# Patient Record
Sex: FEMALE | Race: WHITE
[De-identification: names, ages, dates, MRNs, and addresses within clinical notes are randomized per-mention and may not be internally consistent; named-entity substitution may affect disease eponyms.]

---

## 2017-02-10 ENCOUNTER — HOSPITAL ENCOUNTER (OUTPATIENT)
Dept: HOSPITAL 84 - D.MAMMO | Age: 67
Discharge: HOME | End: 2017-02-10
Attending: FAMILY MEDICINE
Payer: MEDICARE

## 2017-02-10 VITALS — BODY MASS INDEX: 19.9 KG/M2

## 2017-02-10 DIAGNOSIS — Z12.31: Primary | ICD-10-CM

## 2018-10-02 ENCOUNTER — HOSPITAL ENCOUNTER (OUTPATIENT)
Dept: HOSPITAL 84 - D.MRI | Age: 68
Discharge: HOME | End: 2018-10-02
Attending: FAMILY MEDICINE
Payer: MEDICARE

## 2018-10-02 VITALS — BODY MASS INDEX: 19.9 KG/M2

## 2018-10-02 DIAGNOSIS — G45.9: Primary | ICD-10-CM

## 2019-06-11 ENCOUNTER — HOSPITAL ENCOUNTER (OUTPATIENT)
Dept: HOSPITAL 84 - D.MAMMO | Age: 69
Discharge: HOME | End: 2019-06-11
Attending: FAMILY MEDICINE
Payer: MEDICARE

## 2019-06-11 VITALS — BODY MASS INDEX: 19.9 KG/M2

## 2019-06-11 DIAGNOSIS — Z12.31: Primary | ICD-10-CM

## 2019-07-12 ENCOUNTER — HOSPITAL ENCOUNTER (OUTPATIENT)
Dept: HOSPITAL 84 - D.MAMMO | Age: 69
Discharge: HOME | End: 2019-07-12
Attending: FAMILY MEDICINE
Payer: MEDICARE

## 2019-07-12 VITALS — BODY MASS INDEX: 19.9 KG/M2

## 2019-07-12 DIAGNOSIS — R92.8: Primary | ICD-10-CM

## 2019-07-26 ENCOUNTER — HOSPITAL ENCOUNTER (OUTPATIENT)
Dept: HOSPITAL 84 - D.US | Age: 69
Discharge: HOME | End: 2019-07-26
Attending: SURGERY
Payer: MEDICARE

## 2019-07-26 VITALS — BODY MASS INDEX: 19.9 KG/M2

## 2019-07-26 DIAGNOSIS — N63.23: Primary | ICD-10-CM

## 2019-09-06 ENCOUNTER — HOSPITAL ENCOUNTER (OUTPATIENT)
Dept: HOSPITAL 84 - D.HCCARDIO | Age: 69
Discharge: HOME | End: 2019-09-06
Attending: INTERNAL MEDICINE
Payer: MEDICARE

## 2019-09-06 VITALS — BODY MASS INDEX: 19.9 KG/M2

## 2019-09-06 DIAGNOSIS — I25.10: Primary | ICD-10-CM

## 2020-04-07 ENCOUNTER — HOSPITAL ENCOUNTER (INPATIENT)
Dept: HOSPITAL 84 - D.M2 | Age: 70
LOS: 5 days | Discharge: HOME | DRG: 640 | End: 2020-04-12
Attending: FAMILY MEDICINE | Admitting: FAMILY MEDICINE
Payer: MEDICARE

## 2020-04-07 VITALS — SYSTOLIC BLOOD PRESSURE: 113 MMHG | DIASTOLIC BLOOD PRESSURE: 74 MMHG

## 2020-04-07 VITALS
BODY MASS INDEX: 23.02 KG/M2 | WEIGHT: 155.43 LBS | HEIGHT: 69 IN | BODY MASS INDEX: 23.02 KG/M2 | HEIGHT: 69 IN | BODY MASS INDEX: 23.02 KG/M2 | WEIGHT: 155.43 LBS

## 2020-04-07 VITALS — SYSTOLIC BLOOD PRESSURE: 108 MMHG | DIASTOLIC BLOOD PRESSURE: 68 MMHG

## 2020-04-07 VITALS — SYSTOLIC BLOOD PRESSURE: 155 MMHG | DIASTOLIC BLOOD PRESSURE: 72 MMHG

## 2020-04-07 DIAGNOSIS — D50.9: ICD-10-CM

## 2020-04-07 DIAGNOSIS — E05.90: ICD-10-CM

## 2020-04-07 DIAGNOSIS — I50.20: ICD-10-CM

## 2020-04-07 DIAGNOSIS — E11.65: ICD-10-CM

## 2020-04-07 DIAGNOSIS — E87.1: Primary | ICD-10-CM

## 2020-04-07 DIAGNOSIS — K21.9: ICD-10-CM

## 2020-04-07 DIAGNOSIS — E83.42: ICD-10-CM

## 2020-04-07 DIAGNOSIS — G93.41: ICD-10-CM

## 2020-04-07 DIAGNOSIS — I11.0: ICD-10-CM

## 2020-04-07 DIAGNOSIS — E78.5: ICD-10-CM

## 2020-04-07 DIAGNOSIS — I48.91: ICD-10-CM

## 2020-04-07 DIAGNOSIS — I25.10: ICD-10-CM

## 2020-04-07 DIAGNOSIS — E87.6: ICD-10-CM

## 2020-04-07 LAB
ALBUMIN SERPL-MCNC: 3.3 G/DL (ref 3.4–5)
ALP SERPL-CCNC: 98 U/L (ref 30–120)
ALT SERPL-CCNC: 20 U/L (ref 10–68)
ANION GAP SERPL CALC-SCNC: 11.8 MMOL/L (ref 8–16)
BILIRUB SERPL-MCNC: 0.33 MG/DL (ref 0.2–1.3)
BUN SERPL-MCNC: 12 MG/DL (ref 7–18)
CALCIUM SERPL-MCNC: 8.8 MG/DL (ref 8.5–10.1)
CHLORIDE SERPL-SCNC: 89 MMOL/L (ref 98–107)
CO2 SERPL-SCNC: 31.1 MMOL/L (ref 21–32)
CREAT SERPL-MCNC: 0.6 MG/DL (ref 0.6–1.3)
GLOBULIN SER-MCNC: 3.3 G/L
GLUCOSE SERPL-MCNC: 144 MG/DL (ref 74–106)
OSMOLALITY SERPL CALC.SUM OF ELEC: 259 MOSM/KG (ref 275–300)
POTASSIUM SERPL-SCNC: 3.9 MMOL/L (ref 3.5–5.1)
PROT SERPL-MCNC: 6.6 G/DL (ref 6.4–8.2)
SODIUM SERPL-SCNC: 128 MMOL/L (ref 136–145)
TSH SERPL-ACNC: 2.4 UIU/ML (ref 0.36–3.74)

## 2020-04-07 NOTE — NUR
REPORT RECEIVED, WILL CONTINUE POC. PATIENT IS AAOX4, LYING IN SEMI-FOWLERS
POSITION. NO S/S OF DISTRESS OBSERVED, RR EVEN AND UNLABORED ON ROOM AIR. PIV
TO RT HAND, SL. PATIENT DENIES NEEDS AT THIS TIME. CL IN REACH, BED LOCKED AND
LOWERED. WILL CTM.

## 2020-04-08 VITALS — DIASTOLIC BLOOD PRESSURE: 70 MMHG | SYSTOLIC BLOOD PRESSURE: 148 MMHG

## 2020-04-08 VITALS — SYSTOLIC BLOOD PRESSURE: 158 MMHG | DIASTOLIC BLOOD PRESSURE: 70 MMHG

## 2020-04-08 VITALS — SYSTOLIC BLOOD PRESSURE: 151 MMHG | DIASTOLIC BLOOD PRESSURE: 71 MMHG

## 2020-04-08 VITALS — SYSTOLIC BLOOD PRESSURE: 150 MMHG | DIASTOLIC BLOOD PRESSURE: 76 MMHG

## 2020-04-08 VITALS — DIASTOLIC BLOOD PRESSURE: 74 MMHG | SYSTOLIC BLOOD PRESSURE: 157 MMHG

## 2020-04-08 LAB
ANION GAP SERPL CALC-SCNC: 6.4 MMOL/L (ref 8–16)
APTT BLD: 29.5 SECONDS (ref 22.8–39.4)
BASOPHILS NFR BLD AUTO: 0.2 % (ref 0–2)
BILIRUB SERPL-MCNC: NEGATIVE MG/DL
BUN SERPL-MCNC: 8 MG/DL (ref 7–18)
CALCIUM SERPL-MCNC: 8.4 MG/DL (ref 8.5–10.1)
CHLORIDE SERPL-SCNC: 90 MMOL/L (ref 98–107)
CO2 SERPL-SCNC: 30.8 MMOL/L (ref 21–32)
CREAT SERPL-MCNC: 0.6 MG/DL (ref 0.6–1.3)
D DIMER PPP FEU-MCNC: 2.45 UG/MLFEU (ref 0.2–0.54)
EOSINOPHIL NFR BLD: 0 % (ref 0–7)
ERYTHROCYTE [DISTWIDTH] IN BLOOD BY AUTOMATED COUNT: 13.5 % (ref 11.5–14.5)
EST. AVERAGE GLUCOSE BLD GHB EST-MCNC: 143 MG/DL (ref 74–154)
GLUCOSE SERPL-MCNC: 150 MG/DL (ref 74–106)
GLUCOSE SERPL-MCNC: NEGATIVE MG/DL
HCT VFR BLD CALC: 37.4 % (ref 36–48)
HGB BLD-MCNC: 12.2 G/DL (ref 12–16)
IMM GRANULOCYTES NFR BLD: 0 % (ref 0–5)
INR PPP: 1.03 (ref 0.85–1.17)
KETONES UR STRIP-MCNC: NEGATIVE MG/DL
LYMPHOCYTES NFR BLD AUTO: 9.9 % (ref 15–50)
MAGNESIUM SERPL-MCNC: 1.7 MG/DL (ref 1.8–2.4)
MCH RBC QN AUTO: 29.9 PG (ref 26–34)
MCHC RBC AUTO-ENTMCNC: 32.6 G/DL (ref 31–37)
MCV RBC: 91.7 FL (ref 80–100)
MONOCYTES NFR BLD: 9 % (ref 2–11)
NEUTROPHILS NFR BLD AUTO: 80.9 % (ref 40–80)
NITRITE UR-MCNC: NEGATIVE MG/ML
OSMOLALITY SERPL CALC.SUM OF ELEC: 250 MOSM/KG (ref 275–300)
PH UR STRIP: 9 [PH] (ref 5–6)
PHOSPHATE SERPL-MCNC: 2.6 MG/DL (ref 2.5–4.9)
PLATELET # BLD: 411 10X3/UL (ref 130–400)
PMV BLD AUTO: 8.9 FL (ref 7.4–10.4)
POTASSIUM SERPL-SCNC: 3.2 MMOL/L (ref 3.5–5.1)
PROTHROMBIN TIME: 13.5 SECONDS (ref 11.6–15)
RBC # BLD AUTO: 4.08 10X6/UL (ref 4–5.4)
SODIUM SERPL-SCNC: 124 MMOL/L (ref 136–145)
SP GR UR STRIP: 1 (ref 1–1.02)
T4 FREE SERPL-MCNC: 1.8 NG/DL (ref 0.76–1.46)
UROBILINOGEN UR-MCNC: NORMAL MG/DL
WBC # BLD AUTO: 5.3 10X3/UL (ref 4.8–10.8)

## 2020-04-08 NOTE — NUR
REPORT RECEIVED, WILL CONTINUE POC. PATIENT IS AAOX4, LYING IN SEMI-FOWLERS
POSITION. NO S/S OF DISTRESS OBSERVED, RR EVEN AND UNLABORED ON ROOM AIR. PIV
TO RT HAND, EMERSON, SANDRA C/D/I. PATIENT DENIES NEEDS AT THIS TIME. CL IN REACH,
BED LOCKED AND LOWERED. WILL CTM.

## 2020-04-08 NOTE — NUR
PT C/O NAUSEA THIS MORNING, WAS ADMINISTERED PRN NAUSEA MEDICATION AT 0615
UNABLE TO GIVE ANOTHER DOSE AT THIS MOMENT AS MEDICATION IS ORDERED Q8 PRN,
CONTINUE WITH PLAN OF CARE

## 2020-04-08 NOTE — NUR
PATIENT DAUGHTER CALLED FOR UPDATE. PASSCODE NOT VERIFIED. INFORMED DAUGHTER
INFORMATION COULD NOT BE GIVEN WITHOUT PASSCODE.

## 2020-04-09 VITALS — DIASTOLIC BLOOD PRESSURE: 54 MMHG | SYSTOLIC BLOOD PRESSURE: 96 MMHG

## 2020-04-09 VITALS — SYSTOLIC BLOOD PRESSURE: 125 MMHG | DIASTOLIC BLOOD PRESSURE: 57 MMHG

## 2020-04-09 VITALS — DIASTOLIC BLOOD PRESSURE: 90 MMHG | SYSTOLIC BLOOD PRESSURE: 159 MMHG

## 2020-04-09 VITALS — SYSTOLIC BLOOD PRESSURE: 165 MMHG | DIASTOLIC BLOOD PRESSURE: 70 MMHG

## 2020-04-09 VITALS — SYSTOLIC BLOOD PRESSURE: 171 MMHG | DIASTOLIC BLOOD PRESSURE: 79 MMHG

## 2020-04-09 LAB
ANION GAP SERPL CALC-SCNC: 10.8 MMOL/L (ref 8–16)
BASOPHILS NFR BLD AUTO: 0.5 % (ref 0–2)
BUN SERPL-MCNC: 12 MG/DL (ref 7–18)
CALCIUM SERPL-MCNC: 8.7 MG/DL (ref 8.5–10.1)
CHLORIDE SERPL-SCNC: 95 MMOL/L (ref 98–107)
CO2 SERPL-SCNC: 29.6 MMOL/L (ref 21–32)
CREAT SERPL-MCNC: 0.6 MG/DL (ref 0.6–1.3)
EOSINOPHIL NFR BLD: 0.7 % (ref 0–7)
ERYTHROCYTE [DISTWIDTH] IN BLOOD BY AUTOMATED COUNT: 13.7 % (ref 11.5–14.5)
GLUCOSE SERPL-MCNC: 139 MG/DL (ref 74–106)
HCT VFR BLD CALC: 40.5 % (ref 36–48)
HGB BLD-MCNC: 13.1 G/DL (ref 12–16)
IMM GRANULOCYTES NFR BLD: 0.2 % (ref 0–5)
LYMPHOCYTES NFR BLD AUTO: 11.8 % (ref 15–50)
MAGNESIUM SERPL-MCNC: 2.4 MG/DL (ref 1.8–2.4)
MCH RBC QN AUTO: 29.8 PG (ref 26–34)
MCHC RBC AUTO-ENTMCNC: 32.3 G/DL (ref 31–37)
MCV RBC: 92.3 FL (ref 80–100)
MONOCYTES NFR BLD: 14.1 % (ref 2–11)
NEUTROPHILS NFR BLD AUTO: 72.7 % (ref 40–80)
OSMOLALITY SERPL CALC.SUM OF ELEC: 266 MOSM/KG (ref 275–300)
PHOSPHATE SERPL-MCNC: 3.3 MG/DL (ref 2.5–4.9)
PLATELET # BLD: 406 10X3/UL (ref 130–400)
PMV BLD AUTO: 9.7 FL (ref 7.4–10.4)
POTASSIUM SERPL-SCNC: 3.4 MMOL/L (ref 3.5–5.1)
RBC # BLD AUTO: 4.39 10X6/UL (ref 4–5.4)
SODIUM SERPL-SCNC: 132 MMOL/L (ref 136–145)
THYROGLOB AB SERPL-ACNC: <1 IU/ML (ref 0–0.9)
THYROPEROXIDASE AB SERPL-ACNC: <9 IU/ML (ref 0–34)
WBC # BLD AUTO: 6 10X3/UL (ref 4.8–10.8)

## 2020-04-09 NOTE — NUR
PATIENT REFUSED HER INSULIN FOR A 154 FSBS BECAUSE AT HOME SHE DOSENT TAKE
INSULIN SHE TAKES PILLS. EDUCATED HER WHY INSULIN HERE AND SHE STATES NO SHE
DOSE NOT WANT. REFUSED HER EYE DROPS BECAUSE SHE STATES AT HOME SHE ONLY TAKES
TWO BUT CANT GET HER  TO ANSWER THE PHONE AND DOSENT REMEMBER WHICH
ONES SHE TAKE SO SHE DOSENT WANT ANY. SHE STATES SHE WILL BE FINE SHE MISSING
THEM AT HOME SOMETIMES AND STATES SHE WILL HOPEFULLY BEING GOING HOME IN THE
MORNING ANYWAYS.

## 2020-04-09 NOTE — NUR
PT LAYING SUPINE. RR EVEN AND UNLABORED. DENIES NEEDS OR PAIN AT THIS TIME,
CALL LIGHT WIHIN REACH. BED IN LOWEST POSITION. WILL CONTINUE TO MONITOR.

## 2020-04-09 NOTE — NUR
AT , PER POLICY WAS TOLD NO VISITORS AT THIS TIME. RECIEVED
PERMISSION FROM PT TO SPEAK WITH HIM. SPENT APPROX. 30 MINUTES TALKING TO HIM
ABOUT PLAN OF CARE. VERBALIZED UNDERSTANDING. ALSO SPOKE WITH DAUGHTER ON
PHONE AND SPENT AN ADDITIONAL 10 MINUTES SPEAKING WITH HER ABOUT PLAN OF CARE
WITH PT'S PERMISSION. VERBALIZED UNDERSTANDING. UPDATED PT WITH HER POC AS
WELL. LOR CLEMENTE STATED SHE SPOKE WITH DR FUENTES AND ORDERS TO BE
RECIEVED. PT TO SHOWER WITH MINIMAL ASSISTANCE. RECIEVED BAG WITH PERSONAL
ITEMS FROM . STATES IT IS ALL THERE. WILL CONTINUE TO MONITOR.

## 2020-04-09 NOTE — NUR
REPORT RECIEVD AND ROUNDING COMPLETE. PATIENT LAYING IN BED IN LOW FOWLERS
POSITION. STATES FEELING BETTER TODAY. RIGHT HAND PIV THAT IS PATENT AND
FLUSHES WELL. A&O X4. NO S/SX OF DISTRESS AT THIS TIME. STATES NO NEEDS AT
THIS TIME. CALL LIGHT WITHIN REACH AND BED IN LOWEST LOCKED POSITION.

## 2020-04-09 NOTE — [facility]ARE
CASE MANAGEMENT DISCHARGE SUMMARY
 
 
PATIENT: CAYDEN TORRES                          UNIT: [id number]
ACCOUNT#: [id number]                       ADM DATE: 20
AGE: 69     : 46  SEX: F            ROOM/BED: D.2104    
AUTHOR: AMIRADOC                             PHYSICIAN:                               
 
REFERRING PHYSICIAN: LAURA AGUILLON MD              
DATE OF SERVICE: 20
Discharge Plan
 
 
Patient Name: CAYDEN TORRES
Facility: [facility]:[place]
Encounter #: [id number]
Medical Record #: [id number]
: 1950
Planned Disposition: Home
Anticipated Discharge Date: 
 
Discharge Date: 
Expected LOS: 
Initial Reviewer: [email or web address]
Initial Review Date: 2020
Generated: 20   3:43 pm 
Comments
 
DCP- Discharge Planning
 
Updated by [id number]: Dru Barton on 20   1:42 pm CT
Patient Name: CAYDEN TORRES                                     
Admission Status: Elective   
Accout number: [id number]                              
Admission Date: 2020   
: 1950                                                        
Admission Diagnosis:   
Attending: LAURA HADDAD                                                
Current LOS:  2   
  
Anticipated DC Date:    
Planned Disposition: Home   
Primary Insurance: MEDICARE A & B   
  
  
Discharge Planning Comments:   
CM MET WITH PT IN ROOM TO DISCUSS DISCHARGE PLANNING AND NEEDS. PT REPORTS 
LIVING AT HOME INDEPENDENTLY WITH HER SPOUSE. PT HAS WALKER WITH NO MEDICAL 
EQUIPMENT PROVIDER PREFERENCE.  PT HAS NO OUTSIDE SERVICES ASSISTING IN THE 
HOME. CM DISCUSSED AVAILABILITY OF HOME HEALTH, REHAB SERVICES AND MEDICAL 
 
EQUIPMENT. PT DENIES DISCHARGE NEEDS, REPORTS HER SPOUSE WILL PICK HER UP FOR 
DISCHARGE HOME  
  
  
: Dru Barton
 DCPIA - Discharge Planning Initial Assessment
 
Updated by [id number]: Dru Barton on 20   2:42 pm
*  Is the patient Alert and Oriented?
Yes
*  How many steps to enter\exit or inside your home? 0-I/13-I *  PCP DR. AGUILLON *  Pharmacy
[facility]
*  Preadmission Environment
Home with Family
*  ADLs
Independent
*  Equipment
Walker
*  Other Equipment
NO MEDICAL EQUIPMENT PROVIDER PREFERENCE
*  List name and contact numbers for known caregivers / representatives who 
currently or will assist patient after discharge:
JONAH TORRES, SPOUSE, [phone]
*  Verbal permission to speak to the caregivers and representatives has been 
obtained from the patient.
N/A
*  Community resources currently utilized
None
*  Please name any agencies selected above.
NONE
*  Additional services required to return to the preadmission environment?
No
*  Can the patient safely return to the preadmission environment?
Yes
*  Has this patient been hospitalized within the prior 30 days at any 
hospital?
No
 
 
 
 
 
 
Patient Name: CAYDEN TORRES
 
Encounter #: [id number]
Page [id number]
 
 
 
 
 
Electronically Signed by NATALIIA COLLIER on 20 at 1443
 
 
 
 
 
 
**All edits/amendments must be made on the electronic document**
 
DICTATION DATE: 20     : MADAI  20     
RPT#: [id number]                                DC DATE:        
                                               STATUS: ADM IN  
[facility]
 [place] [address]
***END OF REPORT***

## 2020-04-10 VITALS — DIASTOLIC BLOOD PRESSURE: 77 MMHG | SYSTOLIC BLOOD PRESSURE: 149 MMHG

## 2020-04-10 VITALS — SYSTOLIC BLOOD PRESSURE: 147 MMHG | DIASTOLIC BLOOD PRESSURE: 82 MMHG

## 2020-04-10 VITALS — SYSTOLIC BLOOD PRESSURE: 145 MMHG | DIASTOLIC BLOOD PRESSURE: 83 MMHG

## 2020-04-10 VITALS — DIASTOLIC BLOOD PRESSURE: 75 MMHG | SYSTOLIC BLOOD PRESSURE: 139 MMHG

## 2020-04-10 LAB
ANION GAP SERPL CALC-SCNC: 8.1 MMOL/L (ref 8–16)
BASOPHILS NFR BLD AUTO: 0.5 % (ref 0–2)
BUN SERPL-MCNC: 20 MG/DL (ref 7–18)
CALCIUM SERPL-MCNC: 8.6 MG/DL (ref 8.5–10.1)
CHLORIDE SERPL-SCNC: 100 MMOL/L (ref 98–107)
CO2 SERPL-SCNC: 33 MMOL/L (ref 21–32)
CREAT SERPL-MCNC: 0.8 MG/DL (ref 0.6–1.3)
EOSINOPHIL NFR BLD: 1.8 % (ref 0–7)
ERYTHROCYTE [DISTWIDTH] IN BLOOD BY AUTOMATED COUNT: 14.2 % (ref 11.5–14.5)
GLUCOSE SERPL-MCNC: 121 MG/DL (ref 74–106)
HCT VFR BLD CALC: 39.6 % (ref 36–48)
HGB BLD-MCNC: 12.3 G/DL (ref 12–16)
IMM GRANULOCYTES NFR BLD: 0.2 % (ref 0–5)
LYMPHOCYTES NFR BLD AUTO: 12.5 % (ref 15–50)
MAGNESIUM SERPL-MCNC: 2.4 MG/DL (ref 1.8–2.4)
MCH RBC QN AUTO: 29.4 PG (ref 26–34)
MCHC RBC AUTO-ENTMCNC: 31.1 G/DL (ref 31–37)
MCV RBC: 94.7 FL (ref 80–100)
MONOCYTES NFR BLD: 17.1 % (ref 2–11)
NEUTROPHILS NFR BLD AUTO: 67.9 % (ref 40–80)
OSMOLALITY SERPL CALC.SUM OF ELEC: 277 MOSM/KG (ref 275–300)
PHOSPHATE SERPL-MCNC: 3.9 MG/DL (ref 2.5–4.9)
PLATELET # BLD: 368 10X3/UL (ref 130–400)
PMV BLD AUTO: 9.4 FL (ref 7.4–10.4)
POTASSIUM SERPL-SCNC: 4.1 MMOL/L (ref 3.5–5.1)
RBC # BLD AUTO: 4.18 10X6/UL (ref 4–5.4)
SODIUM SERPL-SCNC: 137 MMOL/L (ref 136–145)
WBC # BLD AUTO: 6.1 10X3/UL (ref 4.8–10.8)

## 2020-04-10 NOTE — NUR
PT LYINIG IN BED AWAKE ALWERT AND ORIENTED x4. PT SEEMS TO BED SAD. STATES
THAT SHE MISSES HER FAMILY. RESPIRATIONS EVEN AND UNLABORED. ENCOURAGED PT TO
CALL FOR HELP OR AS NEEDED. CALL LIGHT WITH IN REACH AND BED ISIN LOWEST
POSITION. WILL CONTINUE TO MONITOR.

## 2020-04-10 NOTE — NUR
Nutrition Follow-up:
Gallbladder US and GES today. Pt reports tolerating PO intake better yesterday
afternoon/this morning; eating light foods (ie yogurt, soup, etc).
Wt: 155# (4/10); 166# (admit)
Last BM: PTA
Labs noted: Na 137, Glu 121
Meds noted: Protonix, KDur
-Rec resume diet following tests when medically feasible.
-Encourage PO intake and honor food preferences within diet restrictions.
-Monitor wt.
-RD following.

## 2020-04-10 NOTE — NUR
PATIENT STATES SHE WANTS TO GO HOME AND TAKE HER OWN MEDICATIONS THE WAY SHE
TAKES THEM AT HOME. PATIENT SLEEPY. NOT WANTING TO GET UP.

## 2020-04-11 VITALS — DIASTOLIC BLOOD PRESSURE: 72 MMHG | SYSTOLIC BLOOD PRESSURE: 134 MMHG

## 2020-04-11 VITALS — DIASTOLIC BLOOD PRESSURE: 94 MMHG | SYSTOLIC BLOOD PRESSURE: 118 MMHG

## 2020-04-11 VITALS — SYSTOLIC BLOOD PRESSURE: 103 MMHG | DIASTOLIC BLOOD PRESSURE: 63 MMHG

## 2020-04-11 VITALS — SYSTOLIC BLOOD PRESSURE: 117 MMHG | DIASTOLIC BLOOD PRESSURE: 55 MMHG

## 2020-04-11 VITALS — DIASTOLIC BLOOD PRESSURE: 66 MMHG | SYSTOLIC BLOOD PRESSURE: 126 MMHG

## 2020-04-11 LAB
ANION GAP SERPL CALC-SCNC: 7.5 MMOL/L (ref 8–16)
BASOPHILS NFR BLD AUTO: 0.6 % (ref 0–2)
BUN SERPL-MCNC: 19 MG/DL (ref 7–18)
CALCIUM SERPL-MCNC: 8.8 MG/DL (ref 8.5–10.1)
CHLORIDE SERPL-SCNC: 100 MMOL/L (ref 98–107)
CO2 SERPL-SCNC: 31.6 MMOL/L (ref 21–32)
CREAT SERPL-MCNC: 0.8 MG/DL (ref 0.6–1.3)
EOSINOPHIL NFR BLD: 4.1 % (ref 0–7)
ERYTHROCYTE [DISTWIDTH] IN BLOOD BY AUTOMATED COUNT: 14.6 % (ref 11.5–14.5)
GLUCOSE SERPL-MCNC: 112 MG/DL (ref 74–106)
HCT VFR BLD CALC: 39.5 % (ref 36–48)
HGB BLD-MCNC: 12.2 G/DL (ref 12–16)
IMM GRANULOCYTES NFR BLD: 0.1 % (ref 0–5)
LYMPHOCYTES NFR BLD AUTO: 15 % (ref 15–50)
MAGNESIUM SERPL-MCNC: 2.4 MG/DL (ref 1.8–2.4)
MCH RBC QN AUTO: 29.8 PG (ref 26–34)
MCHC RBC AUTO-ENTMCNC: 30.9 G/DL (ref 31–37)
MCV RBC: 96.3 FL (ref 80–100)
MONOCYTES NFR BLD: 14.1 % (ref 2–11)
NEUTROPHILS NFR BLD AUTO: 66.1 % (ref 40–80)
OSMOLALITY SERPL CALC.SUM OF ELEC: 272 MOSM/KG (ref 275–300)
PHOSPHATE SERPL-MCNC: 4.3 MG/DL (ref 2.5–4.9)
PLATELET # BLD: 377 10X3/UL (ref 130–400)
PMV BLD AUTO: 9.8 FL (ref 7.4–10.4)
POTASSIUM SERPL-SCNC: 4.1 MMOL/L (ref 3.5–5.1)
RBC # BLD AUTO: 4.1 10X6/UL (ref 4–5.4)
SODIUM SERPL-SCNC: 135 MMOL/L (ref 136–145)
WBC # BLD AUTO: 6.8 10X3/UL (ref 4.8–10.8)

## 2020-04-11 NOTE — NUR
BEDSIDE REPORT RECEIVED, PT CARE ASSUMED. INTRODUCED SELF AND WROTE NAME ON
BOARD. PT SITTING UP IN BED, TALKING ON THE PHONE, AAOX4. DENIES ANY NEEDS AT
THIS TIME. BED IN LOWEST POSITION, SR X2, CALL LIGHT WITHIN REACH. WILL
CONTINUE TO MONITOR.

## 2020-04-12 VITALS — SYSTOLIC BLOOD PRESSURE: 129 MMHG | DIASTOLIC BLOOD PRESSURE: 67 MMHG

## 2020-04-12 VITALS — SYSTOLIC BLOOD PRESSURE: 122 MMHG | DIASTOLIC BLOOD PRESSURE: 61 MMHG

## 2020-04-12 LAB
ANION GAP SERPL CALC-SCNC: 11 MMOL/L (ref 8–16)
BASOPHILS NFR BLD AUTO: 0.7 % (ref 0–2)
BUN SERPL-MCNC: 17 MG/DL (ref 7–18)
CALCIUM SERPL-MCNC: 8.8 MG/DL (ref 8.5–10.1)
CHLORIDE SERPL-SCNC: 101 MMOL/L (ref 98–107)
CO2 SERPL-SCNC: 31.7 MMOL/L (ref 21–32)
CREAT SERPL-MCNC: 0.7 MG/DL (ref 0.6–1.3)
EOSINOPHIL NFR BLD: 4.4 % (ref 0–7)
ERYTHROCYTE [DISTWIDTH] IN BLOOD BY AUTOMATED COUNT: 14.4 % (ref 11.5–14.5)
GLUCOSE SERPL-MCNC: 114 MG/DL (ref 74–106)
HCT VFR BLD CALC: 38.3 % (ref 36–48)
HGB BLD-MCNC: 11.7 G/DL (ref 12–16)
IMM GRANULOCYTES NFR BLD: 0.2 % (ref 0–5)
LYMPHOCYTES NFR BLD AUTO: 15 % (ref 15–50)
MAGNESIUM SERPL-MCNC: 2.3 MG/DL (ref 1.8–2.4)
MCH RBC QN AUTO: 29 PG (ref 26–34)
MCHC RBC AUTO-ENTMCNC: 30.5 G/DL (ref 31–37)
MCV RBC: 95 FL (ref 80–100)
MONOCYTES NFR BLD: 11.5 % (ref 2–11)
NEUTROPHILS NFR BLD AUTO: 68.2 % (ref 40–80)
OSMOLALITY SERPL CALC.SUM OF ELEC: 280 MOSM/KG (ref 275–300)
PHOSPHATE SERPL-MCNC: 4.1 MG/DL (ref 2.5–4.9)
PLATELET # BLD: 340 10X3/UL (ref 130–400)
PMV BLD AUTO: 9.6 FL (ref 7.4–10.4)
POTASSIUM SERPL-SCNC: 4.7 MMOL/L (ref 3.5–5.1)
RBC # BLD AUTO: 4.03 10X6/UL (ref 4–5.4)
SODIUM SERPL-SCNC: 139 MMOL/L (ref 136–145)
WBC # BLD AUTO: 5.9 10X3/UL (ref 4.8–10.8)

## 2020-04-12 NOTE — M[facility]ECARE
CASE MANAGEMENT DISCHARGE SUMMARY
 
 
PATIENT: CAYDEN TORRES                          UNIT: [id number]
ACCOUNT#: [id number]                       ADM DATE: 20
AGE: 69     : 46  SEX: F            ROOM/BED: D.0457    
AUTHOR: AMIRADOC                             PHYSICIAN:                               
 
REFERRING PHYSICIAN: LAURA AGUILLON MD              
DATE OF SERVICE: 20
Discharge Plan
 
 
Patient Name: CAYDEN TORRES
Facility: [facility]:[place]
Encounter #: [id number]
Medical Record #: [id number]
: 1950
Planned Disposition: Home
Anticipated Discharge Date: 
 
Discharge Date: 2020
Expected LOS: 0
Initial Reviewer: [id number]
Initial Review Date: 2020
Generated: 20   2:14 pm 
Comments
 
DCP- Discharge Planning
 
Updated by [email or web address]: Precious rPado on 20   9:59 am CT
Patient Name:  CAYDEN TORRES   
Encounter No:  [id number]   
:  1950   
Primary Insurance:  MEDICARE A & B  
Anticipated DC Date:    
Planned Disposition:  Home  
External Planned Provider: :   
  
  
DCP follow-up note: Patient  in agreement with discharge plan. No changes to 
plan. Case management will follow and assist as needed.  
Precious Eve
DCP- Discharge Planning
 
Updated by [email or web address]: Dru Barton on 20   1:42 pm CT
Patient Name: CAYDEN TORRES                                     
Admission Status: Elective   
Accout number: [id number]                              
Admission Date: 2020   
 
: 1950                                                        
Admission Diagnosis:   
Attending: LAURA HADDAD                                                
Current LOS:  2   
  
Anticipated DC Date:    
Planned Disposition: Home   
Primary Insurance: MEDICARE A & B   
  
  
Discharge Planning Comments:   
CM MET WITH PT IN ROOM TO DISCUSS DISCHARGE PLANNING AND NEEDS. PT REPORTS 
LIVING AT HOME INDEPENDENTLY WITH HER SPOUSE. PT HAS WALKER WITH NO MEDICAL 
EQUIPMENT PROVIDER PREFERENCE.  PT HAS NO OUTSIDE SERVICES ASSISTING IN THE 
HOME. CM DISCUSSED AVAILABILITY OF HOME HEALTH, REHAB SERVICES AND MEDICAL 
EQUIPMENT. PT DENIES DISCHARGE NEEDS, REPORTS HER SPOUSE WILL PICK HER UP FOR 
DISCHARGE HOME  
  
  
: Dru Barton
 DCPIA - Discharge Planning Initial Assessment
 
Updated by [id number]: Dru Barton on 20   2:42 pm
*  Is the patient Alert and Oriented?
Yes
*  How many steps to enter\exit or inside your home? 0-I/13-I *  PCP DR. AGUILLON *  Pharmacy
[facility]
*  Preadmission Environment
Home with Family
*  ADLs
Independent
*  Equipment
Walker
*  Other Equipment
NO MEDICAL EQUIPMENT PROVIDER PREFERENCE
*  List name and contact numbers for known caregivers / representatives who 
currently or will assist patient after discharge:
JONAH TORRES, SPOUSE, [phone]
*  Verbal permission to speak to the caregivers and representatives has been 
obtained from the patient.
N/A
*  Community resources currently utilized
None
*  Please name any agencies selected above.
NONE
*  Additional services required to return to the preadmission environment?
No
*  Can the patient safely return to the preadmission environment?
Yes
*  Has this patient been hospitalized within the prior 30 days at any 
hospital?
No
 
 
 
 
 
 
Coverage Notice
 
Reviewer: [email or web address] Armaan Prado
 
Notice Issued Date-Time: 2020  10:45
Notice Type: IM Discharge Notice
 
Notice Delivered To: Patient
Relationship to Patient: Self
Representative Name: 
 
Delivery Method: HAND - Hand Delivered
Holly Days:
Prior Verbal Notification: 
 
Recipient Understood Notice: Yes
Recipient Signature: Yes
Med Rec Note Co-signed by Attending:
 
Coverage Notice Comment:  IMM delivered, explained, signed, given, copy placed
in MR
 
Last DP export: 20  10:06 a
Patient Name: CAYDEN TORRES
Encounter #: [id number]
Page [id number]
 
 
 
 
 
Electronically Signed by NATALIIA COLLIER on 20 at 1314
 
 
 
 
 
 
**All edits/amendments must be made on the electronic document**
 
DICTATION DATE: 20 1314     : MADAI  20 1314     
RPT#: [id number]                                DC DATE:20
                                               STATUS: DIS IN  
[facility]
[address] [place] [address]
***END OF REPORT***

## 2020-04-12 NOTE — MORECARE
CASE MANAGEMENT DISCHARGE SUMMARY
 
 
PATIENT: CAYDEN TORRES                          UNIT: [id number]
ACCOUNT#: [id number]                       ADM DATE: 20
AGE: 69     : 46  SEX: F            ROOM/BED: D.2104    
AUTHOR: AMIRA,DOC                             PHYSICIAN:                               
 
REFERRING PHYSICIAN: LAURA AGUILLON MD              
DATE OF SERVICE: 20
Discharge Plan
 
 
Patient Name: CAYDEN TORRES
Facility: [facility]:[place]
Encounter #: [id number]
Medical Record #: [id number]
: 1950
Planned Disposition: Home
Anticipated Discharge Date: 
 
Discharge Date: 
Expected LOS: 
Initial Reviewer: [email or web address]
Initial Review Date: 2020
Generated: 20  12:05 pm 
Comments
 
DCP- Discharge Planning
 
Updated by [email or web address]: Precious Prado on 20   9:59 am CT
Patient Name:  CAYDEN TORRES   
Encounter No:  [id number]   
:  1950   
Primary Insurance:  MEDICARE A & B  
Anticipated DC Date:    
Planned Disposition:  Home  
External Planned Provider: :   
  
  
DCP follow-up note: Patient  in agreement with discharge plan. No changes to 
plan. Case management will follow and assist as needed.  
Precious Prado
DCP- Discharge Planning
 
Updated by [email or web address]: Dru Barton on 20   1:42 pm CT
Patient Name: CAYDEN TORRES                                     
Admission Status: Elective   
Accout number: [id number]                              
Admission Date: 2020   
 
: 1950                                                        
Admission Diagnosis:   
Attending: LAURA HADDAD                                                
Current LOS:  2   
  
Anticipated DC Date:    
Planned Disposition: Home   
Primary Insurance: MEDICARE A & B   
  
  
Discharge Planning Comments:   
CM MET WITH PT IN ROOM TO DISCUSS DISCHARGE PLANNING AND NEEDS. PT REPORTS 
LIVING AT HOME INDEPENDENTLY WITH HER SPOUSE. PT HAS WALKER WITH NO MEDICAL 
EQUIPMENT PROVIDER PREFERENCE.  PT HAS NO OUTSIDE SERVICES ASSISTING IN THE 
HOME. CM DISCUSSED AVAILABILITY OF HOME HEALTH, REHAB SERVICES AND MEDICAL 
EQUIPMENT. PT DENIES DISCHARGE NEEDS, REPORTS HER SPOUSE WILL PICK HER UP FOR 
DISCHARGE HOME  
  
  
: Dru Barton
 DCPIA - Discharge Planning Initial Assessment
 
Updated by [email or web address]: Dru Barton on 20   2:42 pm
*  Is the patient Alert and Oriented?
Yes
*  How many steps to enter\exit or inside your home? 0-I/13-I *  PCP DR. AGUILLON *  Pharmacy
[facility]
*  Preadmission Environment
Home with Family
*  ADLs
Independent
*  Equipment
Walker
*  Other Equipment
NO MEDICAL EQUIPMENT PROVIDER PREFERENCE
*  List name and contact numbers for known caregivers / representatives who 
currently or will assist patient after discharge:
JONAH TORRES, SPOUSE, [phone]
*  Verbal permission to speak to the caregivers and representatives has been 
obtained from the patient.
N/A
*  Community resources currently utilized
None
*  Please name any agencies selected above.
NONE
*  Additional services required to return to the preadmission environment?
No
*  Can the patient safely return to the preadmission environment?
Yes
*  Has this patient been hospitalized within the prior 30 days at any 
hospital?
No
 
 
 
 
 
 
Coverage Notice
 
Reviewer: [email or web address] Armaan Prado
 
Notice Issued Date-Time: 2020  10:45
Notice Type: IM Discharge Notice
 
Notice Delivered To: Patient
Relationship to Patient: Self
Representative Name: 
 
Delivery Method: HAND - Hand Delivered
Holly Days:
Prior Verbal Notification: 
 
Recipient Understood Notice: Yes
Recipient Signature: Yes
Med Rec Note Co-signed by Attending:
 
Coverage Notice Comment:  IMM delivered, explained, signed, given, copy placed
in MR
 
Last DP export: 20   1:43 pm
Patient Name: CAYDEN TORRES
Encounter #: [id number]
Page [id number]
 
 
 
 
 
Electronically Signed by NATALIIA COLLIER on 20 at 1106
 
 
 
 
 
 
**All edits/amendments must be made on the electronic document**
 
DICTATION DATE: 20     : MADAI  20     
RPT#: [id number]                                DC DATE:        
                                               STATUS: ADM IN  
[facility]
[address] [place] [address]
***END OF REPORT***

## 2020-08-11 ENCOUNTER — HOSPITAL ENCOUNTER (OUTPATIENT)
Dept: HOSPITAL 84 - D.LAB | Age: 70
Discharge: HOME | End: 2020-08-11
Attending: INTERNAL MEDICINE
Payer: MEDICARE

## 2020-08-11 VITALS — BODY MASS INDEX: 24.5 KG/M2

## 2020-08-11 DIAGNOSIS — K29.80: Primary | ICD-10-CM
